# Patient Record
(demographics unavailable — no encounter records)

---

## 2025-06-26 NOTE — PROCEDURE
[Cervical Pap Smear] : cervical Pap smear [Liquid Base] : liquid base [GC & Chlamydia via Pap] : GC & Chlamydia via Pap [Tolerated Well] : the patient tolerated the procedure well [No Complications] : there were no complications [Transabdominal OB Sonogram] : Transabdominal OB Sonogram [Intrauterine Pregnancy] : intrauterine pregnancy [Fetal Heart] : fetal heart present [Date: ___] : Date: [unfilled] [Current GA by Sonogram: ___ (wks)] : Current GA by Sonogram: [unfilled]Uwks [Transabdominal OB Sonogram WNL] : Transabdominal OB Sonogram WNL [FreeTextEntry1] : crl c/w dating, no ff, no masses

## 2025-06-26 NOTE — HISTORY OF PRESENT ILLNESS
[FreeTextEntry1] : 35yo , LMP 3/29 here for annual and +UPT. no complaints. 12.5wks by LMP.    Med/Surg Hx -n/c  Ob /Gyn Hx- Nsd x all, Pap 2021, NO Hx cesario/hpv  NKDA  No Meds